# Patient Record
Sex: FEMALE | Race: WHITE | NOT HISPANIC OR LATINO | Employment: PART TIME | URBAN - METROPOLITAN AREA
[De-identification: names, ages, dates, MRNs, and addresses within clinical notes are randomized per-mention and may not be internally consistent; named-entity substitution may affect disease eponyms.]

---
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
## 2020-01-07 ENCOUNTER — HISTORICAL (OUTPATIENT)
Dept: ADMINISTRATIVE | Facility: HOSPITAL | Age: 20
End: 2020-01-07

## 2020-01-07 LAB
APPEARANCE, UA: CLEAR
B-HCG SERPL QL: NEGATIVE
BACTERIA SPEC CULT: ABNORMAL /HPF
BILIRUB UR QL STRIP: NEGATIVE
COLOR UR: ABNORMAL
GLUCOSE (UA): NEGATIVE
HGB UR QL STRIP: ABNORMAL
KETONES UR QL STRIP: NEGATIVE
LEUKOCYTE ESTERASE UR QL STRIP: ABNORMAL
NITRITE UR QL STRIP: NEGATIVE
PH UR STRIP: 7.5 [PH]
PROT UR QL STRIP: NEGATIVE
RBC #/AREA URNS HPF: ABNORMAL /HPF
SP GR UR STRIP: 1.01
SQUAMOUS EPITHELIAL, UA: ABNORMAL /LPF
UROBILINOGEN UR STRIP-ACNC: 0.2 EU/DL
WBC #/AREA URNS HPF: ABNORMAL /HPF

## 2020-01-11 LAB — FINAL CULTURE: NORMAL

## 2021-09-04 ENCOUNTER — HISTORICAL (OUTPATIENT)
Dept: ADMINISTRATIVE | Facility: HOSPITAL | Age: 21
End: 2021-09-04

## 2022-01-11 LAB
HBV SURFACE AG SERPL QL IA: NEGATIVE
HIV 1+2 AB+HIV1 P24 AG SERPL QL IA: NORMAL
RPR SER QL: NORMAL

## 2022-04-07 ENCOUNTER — HISTORICAL (OUTPATIENT)
Dept: ADMINISTRATIVE | Facility: HOSPITAL | Age: 22
End: 2022-04-07

## 2022-04-23 VITALS
DIASTOLIC BLOOD PRESSURE: 68 MMHG | SYSTOLIC BLOOD PRESSURE: 118 MMHG | BODY MASS INDEX: 26.17 KG/M2 | HEIGHT: 62 IN | WEIGHT: 142.19 LBS | OXYGEN SATURATION: 99 %

## 2022-04-27 ENCOUNTER — HISTORICAL (OUTPATIENT)
Dept: ADMINISTRATIVE | Facility: HOSPITAL | Age: 22
End: 2022-04-27

## 2022-04-30 NOTE — ED PROVIDER NOTES
Patient:   Nikia Reyes             MRN: [id number]            FIN: [id number]               Age:   19 years     Sex:  Female     :  2000   Associated Diagnoses:   Acute UTI   Author:   Estefani Gonzalez      Basic Information   Time seen: Date & time 2020 18:10:00.   History source: Patient.   Arrival mode: Private vehicle, walking.   History limitation: None.   Additional information: Patient's physician(s): Gretel Coon, Chief Complaint from Nursing Triage Note : Chief Complaint   2020 17:13 CST       Chief Complaint           c/o Lt side flank pain when she take deep breath or with movement and no appetite for 2 days  .      History of Present Illness   The patient presents with flank pain.  The onset was 2  days ago.  The course/duration of symptoms is worsening.  The character of symptoms is sharp.  The degree at onset was minimal.  The Location of pain at onset was left and flank.  The degree at present is moderate.  The Location of pain at present is left and flank.  Radiating pain: none. The exacerbating factor is deep breathing, movment.  The relieving factor is none.  Therapy today: none.  Risk factors consist of none.  Associated symptoms: decreased appetitie.        Review of Systems   Constitutional symptoms:  Negative except as documented in HPI, no fever, no chills.    Skin symptoms:  Negative except as documented in HPI.   Eye symptoms:  Negative except as documented in HPI.   ENMT symptoms:  Negative except as documented in HPI.   Respiratory symptoms:  Negative except as documented in HPI.   Cardiovascular symptoms:  Negative except as documented in HPI.   Gastrointestinal symptoms:  Left flank, sharp, no nausea, no vomiting.    Genitourinary symptoms:  No dysuria, no hematuria, no vaginal discharge.    Musculoskeletal symptoms:  Negative except as documented in HPI.   Neurologic symptoms:  Negative except as documented in HPI.   Psychiatric symptoms:  Negative  except as documented in HPI.   Endocrine symptoms:  Negative except as documented in HPI.   Hematologic/Lymphatic symptoms:  Negative except as documented in HPI.   Allergy/immunologic symptoms:  Negative except as documented in HPI.             Additional review of systems information: All systems reviewed as documented in chart.      Health Status   Allergies:    Allergies (1) Active Reaction  No Known Allergies None Documented  , no known allergies.   Medications:  (Selected)   Prescriptions  Prescribed  cetirizine 10 mg oral tablet: 10 mg = 1 tab(s), Oral, Daily, # 30 tab(s), 3 Refill(s), Pharmacy: [facility]  Documented Medications  Documented  NALTREXONE HCL 50 MG TABS: 50 mg = 1 tab(s), Oral, Daily  SERTRALINE HCL 50 MG TABS: 50 mg = 1 tab(s), Oral, Daily  TRI-LINYAH   TAB: 1 tab(s), Oral, Daily.      Past Medical/ Family/ Social History   Medical history: Negative.   Surgical history: Negative.   Family history: Not significant.   Social history:    Social & Psychosocial Habits    Alcohol  02/07/2014 Risk Assessment: Denies Alcohol Use    06/21/2016  Use: Current    Type: Liquor    Frequency: 1-2 times per month    08/13/2016  Use: Never    Substance Use  02/07/2014 Risk Assessment: Denies Substance Abuse    06/21/2016  Use: Past    Type: Marijuana    Comment: 8 months ago - 06/21/2016 13:40 - Xiao Bear RN    08/13/2016  Use: Never    Tobacco  02/07/2014 Risk Assessment: Denies Tobacco Use    01/07/2020  Use: Never (less than 100 in l    Patient Wants Consult For Cessation Counseling N/A    Abuse/Neglect  01/07/2020  SHX Any signs of abuse or neglect No  , Alcohol use: Regularly, Tobacco use: Denies, Drug use: Denies, Occupation: Unemployed, Family/social situation: Unmarried.   Problem list:    Active Problems (1)  No Chronic Problems   .      Physical Examination               Vital Signs   Vital Signs   1/7/2020 17:13 CST       Temperature Oral          37.1 DegC                              Temperature Oral (calculated)             98.78 DegF                             Peripheral Pulse Rate     77 bpm                             Respiratory Rate          17 br/min                             SpO2                      99 %                             Oxygen Therapy            Room air                             Systolic Blood Pressure   103 mmHg                             Diastolic Blood Pressure  65 mmHg  .      Vital Signs (last 24 hrs)_____  Last Charted___________  Temp Oral     37.1 DegC  (JAN 07 17:13)  Heart Rate Peripheral   77 bpm  (JAN 07 17:13)  Resp Rate         17 br/min  (JAN 07 17:13)  SBP      103 mmHg  (JAN 07 17:13)  DBP      65 mmHg  (JAN 07 17:13)  SpO2      99 %  (JAN 07 17:13)  Weight      60.9 kg  (JAN 07 17:13)  Height      153 cm  (JAN 07 17:13)  BMI      26.02  (JAN 07 17:13)  .   Measurements   1/7/2020 17:13 CST       Weight Dosing             60.9 kg                             Weight Measured           60.9 kg                             Weight Measured and Calculated in Lbs     134.26 lb                             Height/Length Dosing      153 cm                             Height/Length Measured    153 cm                             Body Mass Index Measured  26.02 kg/m2  .   Basic Oxygen Information   1/7/2020 17:13 CST       SpO2                      99 %                             Oxygen Therapy            Room air  .   General:  Alert, no acute distress.    Skin:  Warm, dry, intact, normal for ethnicity.    Head:  Normocephalic, atraumatic.    Neck:  Supple, no tenderness.    Eye:  Pupils are equal, round and reactive to light, extraocular movements are intact.    Ears, nose, mouth and throat:  Oral mucosa moist.   Cardiovascular:  Regular rate and rhythm, No murmur, Normal peripheral perfusion, No edema.    Respiratory:  Lungs are clear to auscultation, respirations are non-labored, breath sounds are equal, Symmetrical chest wall expansion.    Chest wall:   No tenderness, No deformity.    Back:  Normal range of motion, Normal alignment, Costovertebral angle tenderness: Left.    Musculoskeletal:  Normal ROM, normal strength, no tenderness, no swelling, no deformity.    Gastrointestinal:  Soft, Non distended, Normal bowel sounds, Tenderness: Mild, left flank.    Neurological:  Alert and oriented to person, place, time, and situation, No focal neurological deficit observed, normal sensory observed, normal motor observed, normal speech observed, normal coordination observed.    Lymphatics:  No lymphadenopathy.   Psychiatric:  Cooperative, appropriate mood & affect, normal judgment.       Medical Decision Making   Documents reviewed:  Emergency department nurses' notes.   Results review:  Lab results : Lab View   1/7/2020 17:20 CST       U Beta hCG Ql             Negative                             UA Appear                 CLEAR                             UA Color                  STRAW                             UA Spec Grav              1.010  NA                             UA Bili                   Negative                             UA pH                     7.5  NA                             UA Urobilinogen           0.2 EU/dL                             UA Blood                  Trace                             UA Glucose                Negative                             UA Ketones                Negative                             UA Protein                Negative                             UA Nitrite                Negative                             UA Leuk Est               LARGE                             UA WBC                    10-25 /HPF                             UA RBC                    0-2 /HPF                             UA Bacteria               None Seen /HPF                             UA Squam Epithelial       Few /LPF  ,    No qualifying data available.       Reexamination/ Reevaluation   Vital signs   Basic Oxygen Information    1/7/2020 17:13 CST       SpO2                      99 %                             Oxygen Therapy            Room air        Impression and Plan   Diagnosis   Acute UTI ([id number] N39.0)   Plan   Condition: Stable.    Disposition: Discharged: Time  1/7/2020 18:17:00, to home.    Prescriptions: Launch prescriptions   Pharmacy:  Diflucan 150 mg oral tablet (Prescribe): 150 mg = 1 tab(s), Oral, Once, [place], DO, # 1 tab(s), 0 Refill(s)  Bactrim  mg-160 mg oral tablet (Prescribe): 1 tab(s), Oral, BID, [place]te, DO, X 7 day(s), # 14 tab(s), 0 Refill(s).    Patient was given the following educational materials: Urinary Tract Infection, Adult.    Follow up with: Gretel Carmona; Call office to Schedule Appointment; Report to Emergency Department if symptoms return or worsen; UTI Rest at home, but not complete bedrest.   Make sure you are drinking plenty fluids.   Return to ED for worsening of symptoms, uncontrolled fever, uncontrolled vomiting, difficulty breathing.   Follow-up with your primary care provider in 2 days.     .    Counseled: Patient, Regarding diagnosis, Regarding diagnostic results, Regarding treatment plan, Regarding prescription, Patient indicated understanding of instructions.    Orders: Launch Orders   Admit/Transfer/Discharge:  Discharge (Order): 1/7/2020 18:19 CST, Home.

## 2022-05-01 NOTE — HISTORICAL [facility] CERNER
This is a historical note converted from Priscilla. Formatting and pictures may have been removed.  Please reference Priscilla for original formatting and attached multimedia. Chief Complaint  New pt...wellness...pt w/ np c/o......gardasil x1  History of Present Illness  New patient, 20-year-old white female  0 for annual GYN exam.??Patient has had Gardasil x1.? She denies complaints today.  has taken ocps in past but d/cd due to weight gain  reports menses fairly cyclic, but hx irregular menses a few years ago  +??dysmenorrhea x 1 day  Gynecological History  Last Menstrual Period: 20  Menstrual Status Intake: Menarcheal  STIs/STDs: No  Intermenstrual Bleeding: No  Current Birth Control Method: None  Dysmenorrhea: Moderate  Flow: Moderate  Dyspareunia: No  Duration of Flow: 5  Postcoital Bleeding: No  Frequency of Cycle: irreg  Dysuria: No  Breast CA Hx: No  Sexually Active: Yes  Review of Systems  General/Constitutional:?  Chills?denies. Fatigue/weakness?denies?. Fever?denies?. Night sweats?denies?.  Skin:  Rash?denies.  Respiratory:  Cough?denies?. Hemoptysis?denies?. SOB?denies?. Sputum production?denies?. Wheezing?denies?.  Cardiovascular:  Chest pain?denies. Dizziness?denies. Palpitations?denies. Swelling in hands/feet?denies.  Breast:  Changes in skin of nipple or breast?denies?. Breast lump?denies. Breast pain?denies. Nipple discharge?denies?.  Gastrointestinal:  Abdominal pain?denies?. Blood in stool?denies?. Constipation?denies?. Diarrhea?denies?. Heartburn?denies?. Nausea?denies?. Vomiting?deniesGenitourinary:  Incontinence?denies?. Blood in urine?denies. Frequent urination?denies?. Painful urination?denies.  Gyneocologic:  Irregular menses?denies. Heavy bleeding?denies. Painful menses?admits. Vaginal discharge/ Odor?denies?. Vaginal lesion/pain?denies. ?Pelvic pain?denies?. Decreased libido?denies. Vulvar lesion/ulcer?denies?. Prolapse of genital organs?denies?. Painful  intercourse?denies?.  Menopausal:  Hot flushes?denies. Vaginal dryness?denies.  Musculoskeletal:  Joint/dalton pain?denies. Decreased ROM?denies. Muscle aches?denies. Swollen joints?denies?. Weakness?denies.  Neurologic:  Confusion?denies. Trouble walking?denies. Trouble with balance?denies?Balance difficulty?denies. Gait abnormalities?denies. Headache?denies. Tingling/Numbness?denies.  Psychiatric:  Mood lability?denies.?Anxiety or panic attacks?denies. Depressed mood?denies. Suicidal thoughts?denies.?  Physical Exam  Vitals & Measurements  T:?35.8? ?C (Temporal Artery)? BP:?118/68?  HT:?157.00?cm? WT:?60.200?kg? BMI:?24.42? LMP:?12/27/2020 00:00 CST?  Chaperone: present  ?   General appearance: - healthy, well-nourished and well-developed  ?   Psychiatric: Orientation to time, place and person. Normal mood and affect and active, alert  ?   Skin:  Appearance: no rashes or lesions  ?   Neck:  Neck: supple, FROM, trachea midline. and no masses  Thyroid: no enlargement or nodules and non-tender  ?  ?   Cardiovascular:  Auscultation: RRR and no murmur.  Peripheral Vascular: no varicosities, LLE edema, RLE edema, calf tenderness, and palpable cord and pedal pulses intact  ?   Lungs:  Respiratory effort: no intercostal retractions or accessory muscle usage  Auscultation: no wheezing, rales/crackles, or rhonchi and clear to auscultation  ?  Breast:? deferred  ?  Abdomen:  Auscultation/Inspection/Palpation: no hepatomegaly, splenomegaly, masses , tenderness? or CVA tenderness and soft, non distended bowel sounds preset  Hernia: no palpable hernias.  ?  Female Genitalia: ?deferred  ?  Lymph Nodes:  Palpation: non tender submandibular nodes  ?  ?  Assessment/Plan  1.?Abnormal gynecological examination?Z01.411  ?Well woman:   - gc/cz   - healthy diet, exercise   - multivitamin   - seat belt   - sunscreen use   - Safe sex education   - contraception education   - STI education   - Gardasil evaluation - RTC for 2nd immunization    ?  ?  2.?Dysmenorrhea?N94.6  Orders:  Clinic Follow up, *Est. 22 3:00:00 CST, Order for future visit, Encounter for gynecological examination (general) (routine) without abnormal findings, ELENA WALTERS  [facility] 18-39 years [id number] [facility], Encounter for gynecological examination (general) (routine) without abnormal findings, ELENA WALTERS, 21 9:22:00 CST  Referrals  Clinic Follow up, *Est. 22 3:00:00 CST, Order for future visit, Encounter for gynecological examination (general) (routine) without abnormal findings, ELENA WALTERS   OB History  Pregnancy History???(0,0,0,0)?? ??  ?  ??No previous pregnancies history have been recorded  Problem List/Past Medical History  Ongoing  No chronic problems  Historical  No qualifying data  Procedure/Surgical History  CLOSURE OF SKIN AND SUBCUTANEOUS TISSUE OTHER SITES (2015)  Simple repair of superficial wounds of scalp, neck, axillae, external genitalia, trunk and/or extremities (including hands and feet); 2.5 cm or less (2015)   Medications  No active medications  Allergies  No Known Allergies  Social History  Abuse/Neglect  No, 2020  No, 2020  Alcohol - Denies Alcohol Use, 2014  Never, 2016  Current, Liquor, 1-2 times per month, 2016  Sexual  Sexually active: Yes. Gender Identity Identifies as female. No, 2021  Substance Use - Denies Substance Abuse, 2014  Never, 2016  Past, Marijuana, 2016  Tobacco - Denies Tobacco Use, 2014  Never (less than 100 in lifetime), N/A, 2020  Never (less than 100 in lifetime), N/A, 2020  Marital Status  Single  Family History  Family history is negative  Immunizations  Vaccine Date Status   human papillomavirus vaccine 2018 Recorded   tetanus/diphtheria/pertussis, acel(Tdap) 2016 Given   Health Maintenance  Health Maintenance  ???Pending?(in the next year)  ??? ??Due?  ??? ? ? ?Alcohol Misuse  Screening due??01/02/21??and every 1??year(s)  ??? ? ? ?ADL Screening due??01/27/21??and every 1??year(s)  ??? ??Due In Future?  ??? ? ? ?Obesity Screening not due until??01/01/22??and every 1??year(s)  ???Satisfied?(in the past 1 year)  ??? ??Satisfied?  ??? ? ? ?Blood Pressure Screening on??01/27/21.??Satisfied by Effie Urena  ??? ? ? ?Body Mass Index Check on??01/27/21.??Satisfied by Effie Urena  ??? ? ? ?Influenza Vaccine on??01/27/21.??Satisfied by Effie Urena  ??? ? ? ?Obesity Screening on??01/27/21.??Satisfied by Effie Urena  ?

## 2023-02-16 ENCOUNTER — OFFICE VISIT (OUTPATIENT)
Dept: OBSTETRICS AND GYNECOLOGY | Facility: CLINIC | Age: 23
End: 2023-02-16
Payer: MEDICAID

## 2023-02-16 VITALS
SYSTOLIC BLOOD PRESSURE: 118 MMHG | BODY MASS INDEX: 27.06 KG/M2 | TEMPERATURE: 98 F | HEIGHT: 61 IN | WEIGHT: 143.31 LBS | DIASTOLIC BLOOD PRESSURE: 64 MMHG

## 2023-02-16 DIAGNOSIS — Z01.411 ABNORMAL GYNECOLOGICAL EXAMINATION: Primary | ICD-10-CM

## 2023-02-16 DIAGNOSIS — N92.6 IRREGULAR BLEEDING: ICD-10-CM

## 2023-02-16 DIAGNOSIS — Z23 NEED FOR HPV VACCINATION: ICD-10-CM

## 2023-02-16 LAB
B-HCG UR QL: NEGATIVE
CTP QC/QA: YES

## 2023-02-16 PROCEDURE — 1160F PR REVIEW ALL MEDS BY PRESCRIBER/CLIN PHARMACIST DOCUMENTED: ICD-10-PCS | Mod: CPTII,,, | Performed by: NURSE PRACTITIONER

## 2023-02-16 PROCEDURE — 1159F MED LIST DOCD IN RCRD: CPT | Mod: CPTII,,, | Performed by: NURSE PRACTITIONER

## 2023-02-16 PROCEDURE — 3008F PR BODY MASS INDEX (BMI) DOCUMENTED: ICD-10-PCS | Mod: CPTII,,, | Performed by: NURSE PRACTITIONER

## 2023-02-16 PROCEDURE — 3078F DIAST BP <80 MM HG: CPT | Mod: CPTII,,, | Performed by: NURSE PRACTITIONER

## 2023-02-16 PROCEDURE — 3074F PR MOST RECENT SYSTOLIC BLOOD PRESSURE < 130 MM HG: ICD-10-PCS | Mod: CPTII,,, | Performed by: NURSE PRACTITIONER

## 2023-02-16 PROCEDURE — 1159F PR MEDICATION LIST DOCUMENTED IN MEDICAL RECORD: ICD-10-PCS | Mod: CPTII,,, | Performed by: NURSE PRACTITIONER

## 2023-02-16 PROCEDURE — 3078F PR MOST RECENT DIASTOLIC BLOOD PRESSURE < 80 MM HG: ICD-10-PCS | Mod: CPTII,,, | Performed by: NURSE PRACTITIONER

## 2023-02-16 PROCEDURE — 99395 PR PREVENTIVE VISIT,EST,18-39: ICD-10-PCS | Mod: ,,, | Performed by: NURSE PRACTITIONER

## 2023-02-16 PROCEDURE — 99395 PREV VISIT EST AGE 18-39: CPT | Mod: ,,, | Performed by: NURSE PRACTITIONER

## 2023-02-16 PROCEDURE — 3008F BODY MASS INDEX DOCD: CPT | Mod: CPTII,,, | Performed by: NURSE PRACTITIONER

## 2023-02-16 PROCEDURE — 3074F SYST BP LT 130 MM HG: CPT | Mod: CPTII,,, | Performed by: NURSE PRACTITIONER

## 2023-02-16 PROCEDURE — 1160F RVW MEDS BY RX/DR IN RCRD: CPT | Mod: CPTII,,, | Performed by: NURSE PRACTITIONER

## 2023-02-16 PROCEDURE — 81025 POCT URINE PREGNANCY: ICD-10-PCS | Mod: ,,, | Performed by: NURSE PRACTITIONER

## 2023-02-16 PROCEDURE — 81025 URINE PREGNANCY TEST: CPT | Mod: ,,, | Performed by: NURSE PRACTITIONER

## 2023-02-16 NOTE — PROGRESS NOTES
Chief Complaint: Annual exam    Chief Complaint   Patient presents with    Well Woman            HPI:   22 y.o. WF   presents for an annual gyn exam.  Pt reports she had a cycle at the beginning of January but started bleeding at the end of January for 2 weeks after she took Plan B, unsure of exact date. Pt reports condoms for contraception.    LMP:  01/28/2023 x2 weeks   Gardasil: x1    FmHx: negative for breast, uterine, ovarian, and colon cancers.       Labs / Significant Studies:  Last Pap  2022, negative        History reviewed. No pertinent family history.      History reviewed. No pertinent past medical history.  History reviewed. No pertinent surgical history.  No current outpatient medications on file.    Review of patient's allergies indicates:  No Known Allergies    Social History     Tobacco Use    Smoking status: Never    Smokeless tobacco: Never   Substance Use Topics    Alcohol use: Never    Drug use: Never         Review of Systems   Constitutional:  Negative for appetite change, chills, fatigue, fever and unexpected weight change.   Gastrointestinal:  Negative for abdominal pain, blood in stool, constipation, diarrhea, nausea, vomiting and reflux.   Genitourinary:  Positive for menstrual problem. Negative for bladder incontinence, decreased libido, dysmenorrhea, dyspareunia, dysuria, flank pain, frequency, genital sores, hematuria, hot flashes, menorrhagia, pelvic pain, urgency, vaginal bleeding, vaginal discharge, vaginal pain, urinary incontinence, postcoital bleeding, postmenopausal bleeding, vaginal dryness and vaginal odor.        +Irregular bleeding.    Integumentary:  Negative for rash, acne, hair changes and mole/lesion.   Neurological:  Negative for headaches.      Physical Exam:   Vitals:    23 0837   BP: 118/64   Temp: 97.5 °F (36.4 °C)     Body mass index is 27.41 kg/m².    Physical Exam   Constitutional: She is oriented to person, place, and time. Vital signs are  normal. She appears well-developed and well-nourished.   Neck: No thyroid mass present.   Cardiovascular: Normal rate, regular rhythm, normal heart sounds and normal pulses.   No murmur heard.  Pulmonary/Chest: Breath sounds normal. No respiratory distress. She has no decreased breath sounds. She has no wheezes. She has no rhonchi. She has no rales. She exhibits no retraction. Right breast exhibits no inverted nipple, no mass, no nipple discharge, no skin change and no tenderness. Left breast exhibits no inverted nipple, no mass, no nipple discharge, no skin change and no tenderness.   Abdominal: Bowel sounds are normal. She exhibits no mass. There is no abdominal tenderness. No hernia.   Genitourinary: Vagina normal, uterus normal and cervix normal. Rectal exam shows no external hemorrhoid and no tenderness. No erythema, tenderness, rectocele or cystocele in the vagina. Right adnexum displays no mass, no tenderness and no fullness. Left adnexum displays no mass, no tenderness and no fullness. Cervix exhibits no discharge and no friability. Uterus is not deviated, not enlarged, not fixed, not tender, present, not hosting fibroids and not experiencing uterine prolapse.   Musculoskeletal:      Cervical back: No edema.      Right lower leg: No edema.      Left lower leg: No edema.   Lymphadenopathy:        Head (right side): No submandibular and no preauricular adenopathy present.        Head (left side): No submandibular and no preauricular adenopathy present.        Right: No supraclavicular adenopathy present.        Left: No supraclavicular adenopathy present.   Neurological: She is alert and oriented to person, place, and time.   Skin: Skin is warm and dry. No rash noted. No erythema. No pallor.   Psychiatric: She has a normal mood and affect. Her behavior is normal. Mood and thought content normal. Her mood appears not anxious. She does not exhibit a depressed mood. She expresses no homicidal and no suicidal  ideation. She expresses no suicidal plans and no homicidal plans.        Assessment:     There is no problem list on file for this patient.      Health Maintenance Due   Topic Date Due    Hepatitis C Screening  Never done    COVID-19 Vaccine (1) Never done    HPV Vaccines (2 - 3-dose series) 03/01/2018    TETANUS VACCINE  Never done    Pap Smear  Never done    Influenza Vaccine (1) 09/01/2022     The patient has no Health Maintenance topics of status Not Due      Plan:    Nikia was seen today for well woman.    Diagnoses and all orders for this visit:    Abnormal gynecological examination  PAP GC/CZ/TV   Counseled regarding safe sex practices and prevention of STD's .  Discussed contraception options.  Advised avoidance of tobacco, alcohol, and drugs.  Discussed breast self-awareness  Seat belt  Multivitamin, Ca/Vit D  Healthy diet and exercise  RTC 1 yr    Irregular bleeding  UPT in office: Negative    Need for HPV vaccination    - HPV is a common viral infection that manifests in some patients as anogenital warts.      - HPV infection is acquired through direct genital contact.     - Educated she may be at risk for other sexually transmitted diseases     - Advised pt on Gardasil vaccine and correct doses to be administered if pt desires.      Pt to come back for 2nd Gardasil injection.     Offered birth control, does not desire at this time

## 2023-02-23 LAB
.: NORMAL
AGE GDLN ACOG TESTING: NORMAL
C TRACH RRNA CVX QL NAA+PROBE: NEGATIVE
CYTOLOGIST CVX/VAG CYTO: NORMAL
CYTOLOGY CVX/VAG DOC CYTO: NORMAL
CYTOLOGY CVX/VAG DOC THIN PREP: NORMAL
DX ICD CODE: NORMAL
Lab: NORMAL
Lab: NORMAL
N GONORRHOEA RRNA CVX QL NAA+PROBE: NEGATIVE
OTHER STN SPEC: NORMAL
STAT OF ADQ CVX/VAG CYTO-IMP: NORMAL
T VAGINALIS RRNA SPEC QL NAA+PROBE: NEGATIVE

## 2023-11-29 ENCOUNTER — OFFICE VISIT (OUTPATIENT)
Dept: OBSTETRICS AND GYNECOLOGY | Facility: CLINIC | Age: 23
End: 2023-11-29
Payer: MEDICAID

## 2023-11-29 VITALS
SYSTOLIC BLOOD PRESSURE: 102 MMHG | HEIGHT: 60 IN | BODY MASS INDEX: 25.72 KG/M2 | WEIGHT: 131 LBS | DIASTOLIC BLOOD PRESSURE: 64 MMHG

## 2023-11-29 DIAGNOSIS — N60.11 BILATERAL FIBROCYSTIC BREAST DISEASE (FCBD): ICD-10-CM

## 2023-11-29 DIAGNOSIS — N60.12 BILATERAL FIBROCYSTIC BREAST DISEASE (FCBD): ICD-10-CM

## 2023-11-29 DIAGNOSIS — N64.4 MASTODYNIA OF LEFT BREAST: Primary | ICD-10-CM

## 2023-11-29 PROCEDURE — 1160F PR REVIEW ALL MEDS BY PRESCRIBER/CLIN PHARMACIST DOCUMENTED: ICD-10-PCS | Mod: CPTII,,, | Performed by: NURSE PRACTITIONER

## 2023-11-29 PROCEDURE — 3078F DIAST BP <80 MM HG: CPT | Mod: CPTII,,, | Performed by: NURSE PRACTITIONER

## 2023-11-29 PROCEDURE — 3074F PR MOST RECENT SYSTOLIC BLOOD PRESSURE < 130 MM HG: ICD-10-PCS | Mod: CPTII,,, | Performed by: NURSE PRACTITIONER

## 2023-11-29 PROCEDURE — 3074F SYST BP LT 130 MM HG: CPT | Mod: CPTII,,, | Performed by: NURSE PRACTITIONER

## 2023-11-29 PROCEDURE — 1160F RVW MEDS BY RX/DR IN RCRD: CPT | Mod: CPTII,,, | Performed by: NURSE PRACTITIONER

## 2023-11-29 PROCEDURE — 3008F PR BODY MASS INDEX (BMI) DOCUMENTED: ICD-10-PCS | Mod: CPTII,,, | Performed by: NURSE PRACTITIONER

## 2023-11-29 PROCEDURE — 99213 PR OFFICE/OUTPT VISIT, EST, LEVL III, 20-29 MIN: ICD-10-PCS | Mod: ,,, | Performed by: NURSE PRACTITIONER

## 2023-11-29 PROCEDURE — 99213 OFFICE O/P EST LOW 20 MIN: CPT | Mod: ,,, | Performed by: NURSE PRACTITIONER

## 2023-11-29 PROCEDURE — 3078F PR MOST RECENT DIASTOLIC BLOOD PRESSURE < 80 MM HG: ICD-10-PCS | Mod: CPTII,,, | Performed by: NURSE PRACTITIONER

## 2023-11-29 PROCEDURE — 1159F PR MEDICATION LIST DOCUMENTED IN MEDICAL RECORD: ICD-10-PCS | Mod: CPTII,,, | Performed by: NURSE PRACTITIONER

## 2023-11-29 PROCEDURE — 3008F BODY MASS INDEX DOCD: CPT | Mod: CPTII,,, | Performed by: NURSE PRACTITIONER

## 2023-11-29 PROCEDURE — 1159F MED LIST DOCD IN RCRD: CPT | Mod: CPTII,,, | Performed by: NURSE PRACTITIONER

## 2023-11-29 NOTE — PROGRESS NOTES
Chief Complaint:     Chief Complaint   Patient presents with    STD CHECK     Pt presents with c/o lump of the L breast. Pt states discovered lump of L breast about 2 weeks ago, pain when palpated, denies and changes in growth. Last PAP: 2023, WNL          HPI:   23 y.o.  female   presents with c/o tender lump left breast x 2 weeks.  LMP 23.  Denies nipple d/c, dimpling or puckering of skin.  Denies family history breast cancer.     LMP: 2023  Frequency: monthly   Cycle length: 5 days   Flow: normal  Intermenstrual bleeding: No  Postcoital bleeding: No  Dysmenorrhea: Yes, moderate  Sexually active: Yes   Dyspareunia: No  Contraception: None   H/o STI: No   Last pap: 2023, WNL   H/o abnl pap: No   Colposcopy: N/A  [place]    No current outpatient medications on file.    Review of patient's allergies indicates:  No Known Allergies    Social History     Tobacco Use    Smoking status: Never    Smokeless tobacco: Never   Substance Use Topics    Alcohol use: Never    Drug use: Never       Review of Systems:   General/Constitutional: Chills denies. Fatigue/weakness denies. Fever denies. Night sweats denies. Hot flashes denies    Respiratory: Cough denies. Hemoptysis denies. SOB denies. Sputum production denies. Wheezing denies .   Cardiovascular: Chest pain denies . Dizziness denies. Palpitations denies. Swelling in hands/feet denies    Gastrointestinal: Abdominal pain denies. Blood in stool denies. Constipation denies. Diarrhea denies. Heartburn denies. Nausea denies. Vomiting denies    Genitourinary: Incontinence denies. Blood in urine denies. Frequent urination denies. Painful urination denies. Urinary urgency denies. Nocturia denies    Gynecologic: Irregular menses denies. Heavy bleeding denies. Painful menses denies. Vaginal discharge denies. Vaginal odor denies. Vaginal itching denies. Vaginal lesion denies. Pelvic pain denies. Decreased libido denies. Vulvar lesion denies. Prolapse of  genital organs denies. Painful intercourse denies. Postcoital bleeding denies    Psychiatric: Depression denies. Anxiety denies       Physical Exam:   Vitals:    11/29/23 1607   BP: 102/64   BP Location: Right arm   Patient Position: Sitting   BP Method: Medium (Manual)   Weight: 59.4 kg (131 lb)   Height: 5' (1.524 m)       Body mass index is 25.58 kg/m².    Physical Exam  Chest:   Breasts:     Breasts are symmetrical.      Right: Normal. No inverted nipple, mass, nipple discharge, skin change or tenderness.      Left: Tenderness present. No inverted nipple, mass, nipple discharge or skin change.      Comments: + bilateral fibrocystic changes noted, no mass            Assessment:     There is no problem list on file for this patient.      Health Maintenance Due   Topic Date Due    Hepatitis C Screening  Never done    COVID-19 Vaccine (1) Never done    HPV Vaccines (2 - 3-dose series) 03/01/2018    Pap Smear  Never done    Influenza Vaccine (1) 09/01/2023     Health Maintenance Topics with due status: Not Due       Topic Last Completion Date    TETANUS VACCINE 06/21/2016    Chlamydia Screening 02/16/2023           Plan:    Nikia was seen today for std check.    Diagnoses and all orders for this visit:    Mastodynia of left breast  D/c caffeine  Aspercream prn  If worsens, or mass, nipple d/c, RTC sooner  Bilateral fibrocystic breast disease (FCBD)  - Discussed recommendations of annual screening after age of 40 with mammogram and MRI for patients with lifetime risk greater than 25%     - Explained that screening is not 100% reliable.  Advised patient if she notices any changes to her breast including a lump, mass, dimpling, discharge, rash, or tenderness she should contact us immediately.     - Recommend monthly BSE

## 2024-02-28 NOTE — PROGRESS NOTES
Chief Complaint: Annual exam    Chief Complaint   Patient presents with    Well Woman       HPI:   23 y.o. F  presents for an annual gyn exam. Cyclic menses. No current use of contraceptives. Pt denies complaints.      LMP: 2024  Last pap: 2023 NIL -GC/CT/TV    FmHx: negative for breast, uterine, ovarian, and colon cancers.     Labs / Significant Studies:  LMP: 2024  Frequency: monthly   Cycle length: 5 days   Flow: normal  Intermenstrual bleeding: No  Postcoital bleeding: No  Dysmenorrhea: Yes, mild  Sexually active: Yes   Dyspareunia: No  Contraception: None   H/o STI: No   Last pap: 2023, WNL   H/o abnl pap: No   Colposcopy: N/A  Gardasil: 1/3  MMG: Never  H/o abnl MMG: N/A  Colonoscopy: Never     Family History   Problem Relation Age of Onset    Breast cancer Neg Hx     Colon cancer Neg Hx     Ovarian cancer Neg Hx     Uterine cancer Neg Hx     Cervical cancer Neg Hx          History reviewed. No pertinent past medical history.  History reviewed. No pertinent surgical history.  No current outpatient medications on file.    Review of patient's allergies indicates:  No Known Allergies    Social History     Tobacco Use    Smoking status: Never     Passive exposure: Never    Smokeless tobacco: Never   Substance Use Topics    Alcohol use: Never    Drug use: Never       Review of Systems:  General/Constitutional: Chills denies. Fatigue/weakness denies. Fever denies. Night sweats denies. Hot flashes denies    Respiratory: Cough denies. Hemoptysis denies. SOB denies. Sputum production denies. Wheezing denies .   Cardiovascular: Chest pain denies . Dizziness denies. Palpitations denies. Swelling in hands/feet denies    Gastrointestinal: Abdominal pain denies. Blood in stool denies. Constipation denies. Diarrhea denies. Heartburn denies. Nausea denies. Vomiting denies    Genitourinary: Incontinence denies. Blood in urine denies. Frequent urination denies. Painful urination denies. Urinary  "urgency denies. Nocturia denies    Gynecologic: Irregular menses denies. Heavy bleeding denies. Painful menses denies. Vaginal discharge denies. Vaginal odor denies. Vaginal itching denies. Vaginal lesion denies. Pelvic pain denies. Decreased libido denies. Vulvar lesion denies. Prolapse of genital organs denies. Painful intercourse denies. Postcoital bleeding denies    Psychiatric: Depression denies. Anxiety denies     Physical Exam:   Vitals:    03/13/24 1350   BP: 120/60   BP Location: Left arm   Patient Position: Sitting   BP Method: Medium (Manual)   Temp: 97.2 °F (36.2 °C)   TempSrc: Temporal   Weight: 63.8 kg (140 lb 9.6 oz)   Height: 5' 2" (1.575 m)       Body mass index is 25.72 kg/m².       Chaperone: present.     General appearance: healthy, well-nourished and well-developed     Psychiatric: Orientation to time, place and person. Normal mood and affect and active, alert     Skin: Appearance: no rashes or lesions.     Neck:   Neck: supple, FROM, trachea midline. and no masses   Thyroid: no enlargement or nodules and non-tender.       Cardiovascular:   Auscultation: RRR and no murmur.   Peripheral Vascular: no varicosities, LLE edema, RLE edema, calf tenderness, and palpable cord and pedal pulses intact.     Lungs:   Respiratory effort: no intercostal retractions or accessory muscle usage.   Auscultation: no wheezing, rales/crackles, or rhonchi and clear to auscultation.     Breast:   Inspection/Palpation: no tenderness, discrete/distinct masses, skin changes, or abnormal secretions. Nipple appearance normal.     Abdomen:   Auscultation/Inspection/Palpation: no hepatomegaly, splenomegaly, masses, tenderness or CVA tenderness and soft, non-distended bowel sounds preset.    Hernia: no palpable hernias.     Female Genitalia:    Vulva: no masses, tenderness or lesions    Bladder/Urethra: no urethral discharge or mass, normal meatus, bladder non-distended.    Vagina: no tenderness, erythema, cystocele, " rectocele, abnormal vaginal discharge or vesicle(s) or ulcers    Cervix: no discharge, no cervical lacerations noted or motion tenderness and grossly normal    Uterus: normal size and shape and midline, non-tender, and no uterine prolapse.    Adnexa/Parametria: no parametrial tenderness or mass, no adnexal tenderness or ovarian mass.     Lymph Nodes:   Palpation: non tender submandibular nodes, axillary nodes, or inguinal nodes.     Rectal Exam:   Rectum: normal perianal skin.       Assessment:     There is no problem list on file for this patient.      Health Maintenance Due   Topic Date Due    Hepatitis C Screening  Never done    COVID-19 Vaccine (1) Never done    HPV Vaccines (2 - 3-dose series) 03/01/2018    Pap Smear  Never done    Influenza Vaccine (1) 09/01/2023    Chlamydia Screening  02/16/2024     Health Maintenance Topics with due status: Not Due       Topic Last Completion Date    TETANUS VACCINE 06/21/2016         Plan:    Nikia was seen today for well woman.    Diagnoses and all orders for this visit:    Normal gynecologic examination  PAP c cultures  Counseled regarding safe sex practices and prevention of STD's .  Discussed contraception options.  Advised avoidance of tobacco, alcohol, and drugs.  Discussed breast self-awareness  Seat belt  Multivitamin, Ca/Vit D  Healthy diet and exercise  RTC 1 yr        This note was transcribed by Shelli Hernández MA. There may be transcription errors as a result, however minimal. Effort has been made to ensure accuracy of transcription, but any obvious errors or omissions should be clarified with the author of the document.

## 2024-03-13 ENCOUNTER — OFFICE VISIT (OUTPATIENT)
Dept: OBSTETRICS AND GYNECOLOGY | Facility: CLINIC | Age: 24
End: 2024-03-13
Payer: MEDICAID

## 2024-03-13 VITALS
HEIGHT: 62 IN | TEMPERATURE: 97 F | SYSTOLIC BLOOD PRESSURE: 120 MMHG | DIASTOLIC BLOOD PRESSURE: 60 MMHG | BODY MASS INDEX: 25.88 KG/M2 | WEIGHT: 140.63 LBS

## 2024-03-13 DIAGNOSIS — Z12.4 CERVICAL CANCER SCREENING: ICD-10-CM

## 2024-03-13 DIAGNOSIS — Z01.419 NORMAL GYNECOLOGIC EXAMINATION: Primary | ICD-10-CM

## 2024-03-13 PROCEDURE — 1159F MED LIST DOCD IN RCRD: CPT | Mod: CPTII,,, | Performed by: NURSE PRACTITIONER

## 2024-03-13 PROCEDURE — 1160F RVW MEDS BY RX/DR IN RCRD: CPT | Mod: CPTII,,, | Performed by: NURSE PRACTITIONER

## 2024-03-13 PROCEDURE — 3074F SYST BP LT 130 MM HG: CPT | Mod: CPTII,,, | Performed by: NURSE PRACTITIONER

## 2024-03-13 PROCEDURE — 99395 PREV VISIT EST AGE 18-39: CPT | Mod: ,,, | Performed by: NURSE PRACTITIONER

## 2024-03-13 PROCEDURE — 3078F DIAST BP <80 MM HG: CPT | Mod: CPTII,,, | Performed by: NURSE PRACTITIONER

## 2024-03-13 PROCEDURE — 3008F BODY MASS INDEX DOCD: CPT | Mod: CPTII,,, | Performed by: NURSE PRACTITIONER

## 2024-03-18 LAB — PSYCHE PATHOLOGY RESULT: NORMAL

## 2024-03-19 NOTE — PROGRESS NOTES
Hey,   Your pap smear and sexually transmitted infection testing is negative.  We will plan to repeat it in 1 year or as needed.  If you have any problems, call the office to schedule.  Have a great day!  Theodora